# Patient Record
Sex: FEMALE | Race: BLACK OR AFRICAN AMERICAN | URBAN - METROPOLITAN AREA
[De-identification: names, ages, dates, MRNs, and addresses within clinical notes are randomized per-mention and may not be internally consistent; named-entity substitution may affect disease eponyms.]

---

## 2023-12-29 ENCOUNTER — OFFICE VISIT (OUTPATIENT)
Dept: PRIMARY CARE | Facility: CLINIC | Age: 58
End: 2023-12-29
Payer: COMMERCIAL

## 2023-12-29 VITALS
TEMPERATURE: 96.5 F | RESPIRATION RATE: 16 BRPM | HEIGHT: 66 IN | HEART RATE: 72 BPM | WEIGHT: 256 LBS | SYSTOLIC BLOOD PRESSURE: 129 MMHG | BODY MASS INDEX: 41.14 KG/M2 | DIASTOLIC BLOOD PRESSURE: 77 MMHG | OXYGEN SATURATION: 100 %

## 2023-12-29 DIAGNOSIS — E66.3 SEVERELY OVERWEIGHT: ICD-10-CM

## 2023-12-29 DIAGNOSIS — D50.0 IRON DEFICIENCY ANEMIA SECONDARY TO BLOOD LOSS (CHRONIC): ICD-10-CM

## 2023-12-29 DIAGNOSIS — Z00.00 HEALTHCARE MAINTENANCE: Primary | ICD-10-CM

## 2023-12-29 LAB
25(OH)D3 SERPL-MCNC: 6 NG/ML (ref 30–100)
BASOPHILS # BLD AUTO: 0.02 X10*3/UL (ref 0–0.1)
BASOPHILS NFR BLD AUTO: 0.6 %
EOSINOPHIL # BLD AUTO: 0.02 X10*3/UL (ref 0–0.7)
EOSINOPHIL NFR BLD AUTO: 0.6 %
ERYTHROCYTE [DISTWIDTH] IN BLOOD BY AUTOMATED COUNT: 18.2 % (ref 11.5–14.5)
EST. AVERAGE GLUCOSE BLD GHB EST-MCNC: 114 MG/DL
HBA1C MFR BLD: 5.6 %
HCT VFR BLD AUTO: 29 % (ref 36–46)
HGB BLD-MCNC: 8.4 G/DL (ref 12–16)
IMM GRANULOCYTES # BLD AUTO: 0.01 X10*3/UL (ref 0–0.7)
IMM GRANULOCYTES NFR BLD AUTO: 0.3 % (ref 0–0.9)
LYMPHOCYTES # BLD AUTO: 1.15 X10*3/UL (ref 1.2–4.8)
LYMPHOCYTES NFR BLD AUTO: 34.2 %
MCH RBC QN AUTO: 20.3 PG (ref 26–34)
MCHC RBC AUTO-ENTMCNC: 29 G/DL (ref 32–36)
MCV RBC AUTO: 70 FL (ref 80–100)
MONOCYTES # BLD AUTO: 0.32 X10*3/UL (ref 0.1–1)
MONOCYTES NFR BLD AUTO: 9.5 %
NEUTROPHILS # BLD AUTO: 1.84 X10*3/UL (ref 1.2–7.7)
NEUTROPHILS NFR BLD AUTO: 54.8 %
NRBC BLD-RTO: 0 /100 WBCS (ref 0–0)
PLATELET # BLD AUTO: 285 X10*3/UL (ref 150–450)
RBC # BLD AUTO: 4.14 X10*6/UL (ref 4–5.2)
WBC # BLD AUTO: 3.4 X10*3/UL (ref 4.4–11.3)

## 2023-12-29 PROCEDURE — 1036F TOBACCO NON-USER: CPT | Performed by: NURSE PRACTITIONER

## 2023-12-29 PROCEDURE — 99215 OFFICE O/P EST HI 40 MIN: CPT | Performed by: NURSE PRACTITIONER

## 2023-12-29 PROCEDURE — 36415 COLL VENOUS BLD VENIPUNCTURE: CPT | Performed by: NURSE PRACTITIONER

## 2023-12-29 PROCEDURE — 99215 OFFICE O/P EST HI 40 MIN: CPT | Mod: ZK | Performed by: NURSE PRACTITIONER

## 2023-12-29 PROCEDURE — 83036 HEMOGLOBIN GLYCOSYLATED A1C: CPT | Performed by: NURSE PRACTITIONER

## 2023-12-29 PROCEDURE — 82306 VITAMIN D 25 HYDROXY: CPT | Performed by: NURSE PRACTITIONER

## 2023-12-29 PROCEDURE — 85025 COMPLETE CBC W/AUTO DIFF WBC: CPT | Performed by: NURSE PRACTITIONER

## 2023-12-29 RX ORDER — TIRZEPATIDE 2.5 MG/.5ML
2.5 INJECTION, SOLUTION SUBCUTANEOUS
Qty: 0.5 ML | Refills: 3 | Status: SHIPPED | OUTPATIENT
Start: 2023-12-29 | End: 2024-01-28

## 2023-12-29 ASSESSMENT — PAIN SCALES - GENERAL: PAINLEVEL: 0-NO PAIN

## 2023-12-29 NOTE — PROGRESS NOTES
"Subjective   Andrew Tirado is a 58 y.o. female who presents for Establish Care.  HPI  Here to establish care  Had wanted to get revision of bariatric surgery but declined by insurance  Thinks may have recent vaccinations in anticipation of betting bariatric surgery  + covid vaccination + flu recently  - records at [facility]  Is due for  mmr maybe  Wants the shingrex vaccinations  Only had one hep b shot  Needs tetanus maybe  Has shot records at home   Colonoscopy believes could be up to date   Knows that she is iron deficient anemic  Wants to try weight loss medication    PMHx denied no meds  PSHx: bariatric surgery payam y  17 yrs ago at [facility]  Had upper endoscopy 2022 at [facility] in anticipation of getting bypass  revision  Soc Hx: denied smoking, drinks socially  denied street drug use  FH:  Mom is 78 year old works still  Dad:  did not know about   Older sister age 60 obese    Eye:  2023, just readers  Dental: 2023/getting permanent implants   Breast/mammogram: 5 years ago /denied hormone therapy was on birth control 14+ years  Pap: 6-7 yrs ago, never abnormal  Colonoscopy:  8 years ago - was okay. At [facility], denied polyps  Hospitalized except for childbirth: denied  + gastric bypass   Childbirth: 0   Works at a bank   Does take her vitamins for her gastric bypass       All systems reviewed. Review of systems negative except for noted positives in HPI    Objective     /77   Pulse 72   Temp 35.8 °C (96.5 °F)   Resp 16   Ht 1.676 m (5' 6\")   Wt 116 kg (256 lb)   SpO2 100%   BMI 41.32 kg/m²    Vital signs noted and reviewed.       Physical Exam  Vitals and nursing note reviewed.   Constitutional:       Appearance: Normal appearance.   HENT:      Head: Normocephalic and atraumatic.      Nose: Nose normal.      Mouth/Throat:      Mouth: Mucous membranes are dry.   Eyes:      Extraocular Movements: Extraocular movements intact.      Conjunctiva/sclera: Conjunctivae " normal.      Pupils: Pupils are equal, round, and reactive to light.   Cardiovascular:      Rate and Rhythm: Normal rate and regular rhythm.      Pulses: Normal pulses.      Heart sounds: Normal heart sounds.   Pulmonary:      Effort: Pulmonary effort is normal.      Breath sounds: Normal breath sounds.   Abdominal:      General: Abdomen is flat.      Palpations: Abdomen is soft.   Musculoskeletal:         General: Normal range of motion.      Cervical back: Normal range of motion and neck supple.   Skin:     General: Skin is warm and dry.   Neurological:      General: No focal deficit present.      Mental Status: She is alert and oriented to person, place, and time.   Psychiatric:         Mood and Affect: Mood normal.         Behavior: Behavior normal.         Thought Content: Thought content normal.             Assessment/Plan   Problem List Items Addressed This Visit    None  Visit Diagnoses       Healthcare maintenance    -  Primary    Relevant Medications    zoster vaccine-recombinant adjuvanted (Shingrix) 50 mcg/0.5 mL vaccine    Other Relevant Orders    CBC and Auto Differential    Comprehensive metabolic panel    Vitamin B12    Vitamin D 25-Hydroxy,Total (for eval of Vitamin D levels)    Tsh With Reflex To Free T4 If Abnormal    Lipid panel    BI mammo bilateral screening tomosynthesis    Hemoglobin A1C    Iron deficiency anemia secondary to blood loss (chronic)        Relevant Orders    Iron and TIBC    Severely overweight        Relevant Medications    tirzepatide (Mounjaro) 2.5 mg/0.5 mL pen injector                Pt will try the 2.5 mg dose every week for a month and let us know  She will schedule pap exam and bring back records for colonoscopy - thinks maybe 8 years as well shot records. She will need tetanus, maybe hep b series and mmr  She believes has flu and covid booster and wants to get shingrex which was sent to her pharmacy    See back for pap exam   Otherwise is doing great, staying active and  tries to maintain healthy weight

## 2023-12-29 NOTE — PATIENT INSTRUCTIONS
Lab work today   Routine lab work    Schedule mammogram    See back at least yearly  Es get from the pharmacy  Start the weight loss medication  Schedule pap exam and bring shot record with you   You are doing great

## 2024-02-08 ENCOUNTER — TELEPHONE (OUTPATIENT)
Dept: PRIMARY CARE | Facility: CLINIC | Age: 59
End: 2024-02-08
Payer: COMMERCIAL